# Patient Record
Sex: FEMALE | Race: BLACK OR AFRICAN AMERICAN | Employment: UNEMPLOYED | URBAN - METROPOLITAN AREA
[De-identification: names, ages, dates, MRNs, and addresses within clinical notes are randomized per-mention and may not be internally consistent; named-entity substitution may affect disease eponyms.]

---

## 2019-06-25 ENCOUNTER — HOSPITAL ENCOUNTER (EMERGENCY)
Facility: CLINIC | Age: 24
Discharge: HOME | End: 2019-06-25
Attending: EMERGENCY MEDICINE | Admitting: EMERGENCY MEDICINE
Payer: COMMERCIAL

## 2019-06-25 ENCOUNTER — APPOINTMENT (OUTPATIENT)
Dept: ULTRASOUND IMAGING | Facility: CLINIC | Age: 24
End: 2019-06-25
Attending: EMERGENCY MEDICINE
Payer: COMMERCIAL

## 2019-06-25 VITALS
DIASTOLIC BLOOD PRESSURE: 64 MMHG | BODY MASS INDEX: 29.88 KG/M2 | RESPIRATION RATE: 18 BRPM | TEMPERATURE: 98.3 F | OXYGEN SATURATION: 99 % | HEART RATE: 76 BPM | SYSTOLIC BLOOD PRESSURE: 108 MMHG | WEIGHT: 175 LBS | HEIGHT: 64 IN

## 2019-06-25 DIAGNOSIS — O03.9 SPONTANEOUS MISCARRIAGE: ICD-10-CM

## 2019-06-25 LAB
ABO + RH BLD: NORMAL
ABO + RH BLD: NORMAL
ANISOCYTOSIS BLD QL SMEAR: ABNORMAL
B-HCG SERPL-ACNC: 98 IU/L (ref 0–5)
BASOPHILS # BLD AUTO: 0 10E9/L (ref 0–0.2)
BASOPHILS NFR BLD AUTO: 0.3 %
BLD GP AB SCN SERPL QL: NORMAL
BLOOD BANK CMNT PATIENT-IMP: NORMAL
DIFFERENTIAL METHOD BLD: ABNORMAL
ELLIPTOCYTES BLD QL SMEAR: ABNORMAL
EOSINOPHIL # BLD AUTO: 0.1 10E9/L (ref 0–0.7)
EOSINOPHIL NFR BLD AUTO: 1.3 %
ERYTHROCYTE [DISTWIDTH] IN BLOOD BY AUTOMATED COUNT: 24.5 % (ref 10–15)
HCT VFR BLD AUTO: 33.6 % (ref 35–47)
HGB BLD-MCNC: 10.6 G/DL (ref 11.7–15.7)
IMM GRANULOCYTES # BLD: 0 10E9/L (ref 0–0.4)
IMM GRANULOCYTES NFR BLD: 0 %
LYMPHOCYTES # BLD AUTO: 2.5 10E9/L (ref 0.8–5.3)
LYMPHOCYTES NFR BLD AUTO: 35.6 %
MCH RBC QN AUTO: 22.8 PG (ref 26.5–33)
MCHC RBC AUTO-ENTMCNC: 31.5 G/DL (ref 31.5–36.5)
MCV RBC AUTO: 72 FL (ref 78–100)
MICROCYTES BLD QL SMEAR: PRESENT
MONOCYTES # BLD AUTO: 0.6 10E9/L (ref 0–1.3)
MONOCYTES NFR BLD AUTO: 8.4 %
NEUTROPHILS # BLD AUTO: 3.8 10E9/L (ref 1.6–8.3)
NEUTROPHILS NFR BLD AUTO: 54.4 %
NRBC # BLD AUTO: 0 10*3/UL
NRBC BLD AUTO-RTO: 0 /100
PLATELET # BLD AUTO: 263 10E9/L (ref 150–450)
PLATELET # BLD EST: ABNORMAL 10*3/UL
RBC # BLD AUTO: 4.65 10E12/L (ref 3.8–5.2)
SPECIMEN EXP DATE BLD: NORMAL
WBC # BLD AUTO: 7 10E9/L (ref 4–11)

## 2019-06-25 PROCEDURE — 86900 BLOOD TYPING SEROLOGIC ABO: CPT | Performed by: EMERGENCY MEDICINE

## 2019-06-25 PROCEDURE — 99284 EMERGENCY DEPT VISIT MOD MDM: CPT | Mod: 25

## 2019-06-25 PROCEDURE — 85025 COMPLETE CBC W/AUTO DIFF WBC: CPT | Performed by: EMERGENCY MEDICINE

## 2019-06-25 PROCEDURE — 86850 RBC ANTIBODY SCREEN: CPT | Performed by: EMERGENCY MEDICINE

## 2019-06-25 PROCEDURE — 84702 CHORIONIC GONADOTROPIN TEST: CPT | Performed by: EMERGENCY MEDICINE

## 2019-06-25 PROCEDURE — 86901 BLOOD TYPING SEROLOGIC RH(D): CPT | Performed by: EMERGENCY MEDICINE

## 2019-06-25 PROCEDURE — 25000132 ZZH RX MED GY IP 250 OP 250 PS 637: Performed by: EMERGENCY MEDICINE

## 2019-06-25 PROCEDURE — 76801 OB US < 14 WKS SINGLE FETUS: CPT

## 2019-06-25 RX ORDER — IBUPROFEN 600 MG/1
600 TABLET, FILM COATED ORAL ONCE
Status: COMPLETED | OUTPATIENT
Start: 2019-06-25 | End: 2019-06-25

## 2019-06-25 RX ADMIN — IBUPROFEN 600 MG: 600 TABLET ORAL at 23:08

## 2019-06-25 ASSESSMENT — MIFFLIN-ST. JEOR: SCORE: 1538.79

## 2019-06-25 ASSESSMENT — ENCOUNTER SYMPTOMS
DIZZINESS: 0
LIGHT-HEADEDNESS: 0

## 2019-06-25 NOTE — ED AVS SNAPSHOT
Emergency Department  [address] [place] [address]  Phone:  [phone]  Fax:  [phone]                                    Osvaldo Nunez   MRN: [id number]    Department:   Emergency Department   Date of Visit:  6/25/2019           After Visit Summary Signature Page    I have received my discharge instructions, and my questions have been answered. I have discussed any challenges I see with this plan with the nurse or doctor.    ..........................................................................................................................................  Patient/Patient Representative Signature      ..........................................................................................................................................  Patient Representative Print Name and Relationship to Patient    ..................................................               ................................................  Date                                   Time    ..........................................................................................................................................  Reviewed by Signature/Title    ...................................................              ..............................................  Date                                               Time          22EPIC Rev 08/18

## 2019-06-26 NOTE — ED TRIAGE NOTES
"Patient was 7 weeks pregnant, has had bleeding for the past two weeks, OB told advised her it would be a miscarriage and today patient stated \"baby came out today\" now having more pain and bleeding.   "

## 2019-06-26 NOTE — ED PROVIDER NOTES
"  History     Chief Complaint:  Vaginal Bleeding     HPI   Osvaldo Nunez is a 24 year old female () who presents to the emergency department today for evaluation of vaginal bleeding. The patient reports that her last period occurred on , and she had a positive pregnancy test on . However, for the last two weeks she has been experiencing vaginal bleeding. This prompted presentation to her OB clinic on , at which time her HCG levels were observed to be dropping, and she was informed that her symptoms were likely indicative of a miscarriage. She furthers that today she had \"something hard\" pass, which was followed by increased bleeding. The patient denies dizziness or light headedness.     Per chart review of visit to [facility] on 19:  Osvaldo Nunez is a 24 Y female evaluation of vaginal bleeding in pregnancy. Patient resides in [place], [place] and came to [place] to visit her mother. She has been spotting for the past 6 days and decided to come into the ED tonight. Her HCG on her first ED visit in [facility] on 06/10/19 was 20,496, on her clinic follow up visit on the 19 it was 3,545. Her blood type is A+. No abdominal pain, chest pain, shortness of breath. Of note, she has a history of kidney infection. No other concerns for today's visit.    US OB Transvaginal Ultrasound on 19:  Impending spontaneous .     Ultrasound from [facility] on 6/10/2019:  1. There is a single somewhat misshapen intrauterine gestational sac containing an abnormal appearing yolk sac and small fetal pole with a crown-rump length of 0.2 cm. No cardiac activity is identified. The findings are worrisome for nonviable pregnancy although determination of viability is limited given the early gestational age.    Allergies:  No Known Drug Allergies    Medications:    Medications reviewed. No current medications.     Past Medical History:    Medical history reviewed. No " "pertinent medical history.    Past Surgical History:    Surgical history reviewed. No pertinent surgical history.    Family History:    Family history reviewed. No pertinent family history.     Social History:  The patient was accompanied to the ED by her significant other.    Review of Systems   Genitourinary: Positive for vaginal bleeding (with \"something hard\").   Neurological: Negative for dizziness and light-headedness.   All other systems reviewed and are negative.      Physical Exam     Patient Vitals for the past 24 hrs:   BP Temp Temp src Pulse Resp SpO2 Height Weight   19 2310 108/64 -- -- 76 18 99 % -- --   19 2105 109/63 98.3  F (36.8  C) Oral 67 18 95 % 1.626 m (5' 4\") 79.4 kg (175 lb)     Physical Exam  General: Patient is alert and normal appearing.  HEENT: Head atraumatic    Eyes: pupils equal and reactive. Conjunctiva clear   Nares: patent   Oropharynx: no lesions, uvula midline, no palatal draping, normal voice, no trismus  Neck: Supple without lymphadenopathy, no meningismus  Chest: Heart regular rate and rhythm.   Lungs: Equal clear to auscultation with no wheeze or rales  Abdomen: Soft, underlies lower abdominal tenderness with no rebound or guarding, nondistended, normal bowel sounds  Back: No costovertebral angle tenderness, no midline C, T or L spine tenderness  Neuro: Grossly nonfocal, normal speech, strength equal bilaterally, CN 2-12 intact  Extremities: No deformities, equal radial and DP pulses. No clubbing, cyanosis.  No edema  Skin: Warm and dry with no rash.       Emergency Department Course     Imaging:  Radiology findings were communicated with the patient who voiced understanding of the findings.    US OB <14 Weeks W Transvaginal  1. No intrauterine pregnancy is identified. In the setting of a  positive pregnancy test, differential considerations include an  intrauterine pregnancy too early to visualize, a spontaneous   in progress, or possibly an ectopic " pregnancy. Clinical correlation  and close followup are recommended.  2. A right ovarian simple cyst measures 1.7 cm, and may represent a dominant follicle.  ALLISON SHARMA MD  Reading per radiology    Laboratory:  Laboratory findings were communicated with the patient who voiced understanding of the findings.    CBC: WBC 7.0, HGB 10.6 (L),   ABO/Rh Type and Screen: A+, Antibody Screen negative  HCG Quantitative: 98 (H)    Interventions:  2308 Ibuprofen 600 mg Oral    Emergency Department Course:    2121 Nursing notes and vitals reviewed.    2126 I performed an exam of the patient as documented above.     2205 IV was inserted and blood was drawn for laboratory testing, results above.    2211 The patient was sent for an OB ultrasound while in the emergency department, results above.     2330 Prior to discharge, I personally reviewed the laboratory and imaging results with the patient and answered all related questions. Patient discharged.     Impression & Plan      Medical Decision Making:  Osvaldo Nunez is a 24 year old female who presents to the emergency department today for evaluation of vaginal bleeding and passage of tissue.  Patient is noted to be having a spontaneous miscarriage.  She was initially seen on 6/10/2019 with an hCG of 20,000.  Today her hCG is 98.  She feels that she passed some tissue and now the bleeding has stopped.  On ultrasound there is no evidence of any intrauterine retained products of conception.  Previous ultrasound had revealed intrauterine pregnancy.  I feel this is likely a complete miscarriage at this time.  Patient's blood type is a positive imaged she does not need RhoGam.  Hemoglobin is stable.  I did recommend the patient follow-up with her provider to ensure hCG goes down to 0.  Patient is hemodynamically stable and denies any anemia symptoms.  Plan at this time is for discharge.  Follow-up instructions were provided as were return precautions.    Diagnosis:     ICD-10-CM    1. Spontaneous miscarriage O03.9      Disposition:   The patient is discharged to home.    Discharge Medications:  No discharge medications.    Scribe Disclosure:  I, Eva Colorado, am serving as a scribe at 9:22 PM on 6/25/2019 to document services personally performed by Kate Gates MD based on my observations and the provider's statements to me.     EMERGENCY DEPARTMENT       Kate Gates MD  06/26/19 0009

## 2019-08-22 ENCOUNTER — HOSPITAL ENCOUNTER (EMERGENCY)
Facility: CLINIC | Age: 24
Discharge: HOME | End: 2019-08-22
Attending: EMERGENCY MEDICINE | Admitting: EMERGENCY MEDICINE
Payer: COMMERCIAL

## 2019-08-22 ENCOUNTER — APPOINTMENT (OUTPATIENT)
Dept: CT IMAGING | Facility: CLINIC | Age: 24
End: 2019-08-22
Attending: EMERGENCY MEDICINE
Payer: COMMERCIAL

## 2019-08-22 VITALS
SYSTOLIC BLOOD PRESSURE: 105 MMHG | HEART RATE: 77 BPM | OXYGEN SATURATION: 100 % | DIASTOLIC BLOOD PRESSURE: 82 MMHG | HEIGHT: 65 IN | WEIGHT: 189 LBS | RESPIRATION RATE: 16 BRPM | TEMPERATURE: 98.2 F | BODY MASS INDEX: 31.49 KG/M2

## 2019-08-22 DIAGNOSIS — N10 ACUTE PYELONEPHRITIS: ICD-10-CM

## 2019-08-22 LAB
ALBUMIN SERPL-MCNC: 3.4 G/DL (ref 3.4–5)
ALBUMIN UR-MCNC: 30 MG/DL
ALP SERPL-CCNC: 83 U/L (ref 40–150)
ALT SERPL W P-5'-P-CCNC: 17 U/L (ref 0–50)
ANION GAP SERPL CALCULATED.3IONS-SCNC: 4 MMOL/L (ref 3–14)
APPEARANCE UR: ABNORMAL
AST SERPL W P-5'-P-CCNC: 19 U/L (ref 0–45)
BASOPHILS # BLD AUTO: 0 10E9/L (ref 0–0.2)
BASOPHILS NFR BLD AUTO: 0.2 %
BILIRUB SERPL-MCNC: 0.3 MG/DL (ref 0.2–1.3)
BILIRUB UR QL STRIP: NEGATIVE
BUN SERPL-MCNC: 7 MG/DL (ref 7–30)
CALCIUM SERPL-MCNC: 8.5 MG/DL (ref 8.5–10.1)
CHLORIDE SERPL-SCNC: 107 MMOL/L (ref 94–109)
CO2 SERPL-SCNC: 27 MMOL/L (ref 20–32)
COLOR UR AUTO: ABNORMAL
CREAT SERPL-MCNC: 0.57 MG/DL (ref 0.52–1.04)
DIFFERENTIAL METHOD BLD: ABNORMAL
EOSINOPHIL # BLD AUTO: 0.1 10E9/L (ref 0–0.7)
EOSINOPHIL NFR BLD AUTO: 0.8 %
ERYTHROCYTE [DISTWIDTH] IN BLOOD BY AUTOMATED COUNT: 15.6 % (ref 10–15)
GFR SERPL CREATININE-BSD FRML MDRD: >90 ML/MIN/{1.73_M2}
GLUCOSE SERPL-MCNC: 96 MG/DL (ref 70–99)
GLUCOSE UR STRIP-MCNC: NEGATIVE MG/DL
HCG UR QL: NEGATIVE
HCT VFR BLD AUTO: 31.5 % (ref 35–47)
HGB BLD-MCNC: 9.6 G/DL (ref 11.7–15.7)
HGB UR QL STRIP: ABNORMAL
IMM GRANULOCYTES # BLD: 0 10E9/L (ref 0–0.4)
IMM GRANULOCYTES NFR BLD: 0.2 %
KETONES UR STRIP-MCNC: NEGATIVE MG/DL
LEUKOCYTE ESTERASE UR QL STRIP: ABNORMAL
LIPASE SERPL-CCNC: 117 U/L (ref 73–393)
LYMPHOCYTES # BLD AUTO: 2.1 10E9/L (ref 0.8–5.3)
LYMPHOCYTES NFR BLD AUTO: 20.7 %
MCH RBC QN AUTO: 22.1 PG (ref 26.5–33)
MCHC RBC AUTO-ENTMCNC: 30.5 G/DL (ref 31.5–36.5)
MCV RBC AUTO: 73 FL (ref 78–100)
MONOCYTES # BLD AUTO: 0.7 10E9/L (ref 0–1.3)
MONOCYTES NFR BLD AUTO: 7.2 %
MUCOUS THREADS #/AREA URNS LPF: PRESENT /LPF
NEUTROPHILS # BLD AUTO: 7.2 10E9/L (ref 1.6–8.3)
NEUTROPHILS NFR BLD AUTO: 70.9 %
NITRATE UR QL: NEGATIVE
NRBC # BLD AUTO: 0 10*3/UL
NRBC BLD AUTO-RTO: 0 /100
PH UR STRIP: 7.5 PH (ref 5–7)
PLATELET # BLD AUTO: 243 10E9/L (ref 150–450)
POTASSIUM SERPL-SCNC: 3.7 MMOL/L (ref 3.4–5.3)
PROT SERPL-MCNC: 8 G/DL (ref 6.8–8.8)
RBC # BLD AUTO: 4.34 10E12/L (ref 3.8–5.2)
RBC #/AREA URNS AUTO: 90 /HPF (ref 0–2)
SODIUM SERPL-SCNC: 138 MMOL/L (ref 133–144)
SOURCE: ABNORMAL
SP GR UR STRIP: 1.01 (ref 1–1.03)
SQUAMOUS #/AREA URNS AUTO: 3 /HPF (ref 0–1)
UROBILINOGEN UR STRIP-MCNC: NORMAL MG/DL (ref 0–2)
WBC # BLD AUTO: 10.2 10E9/L (ref 4–11)
WBC #/AREA URNS AUTO: >182 /HPF (ref 0–5)
WBC CLUMPS #/AREA URNS HPF: PRESENT /HPF

## 2019-08-22 PROCEDURE — 74176 CT ABD & PELVIS W/O CONTRAST: CPT

## 2019-08-22 PROCEDURE — 87086 URINE CULTURE/COLONY COUNT: CPT | Performed by: EMERGENCY MEDICINE

## 2019-08-22 PROCEDURE — 96365 THER/PROPH/DIAG IV INF INIT: CPT

## 2019-08-22 PROCEDURE — 96375 TX/PRO/DX INJ NEW DRUG ADDON: CPT

## 2019-08-22 PROCEDURE — 81025 URINE PREGNANCY TEST: CPT | Performed by: EMERGENCY MEDICINE

## 2019-08-22 PROCEDURE — 80053 COMPREHEN METABOLIC PANEL: CPT | Performed by: EMERGENCY MEDICINE

## 2019-08-22 PROCEDURE — 87186 SC STD MICRODIL/AGAR DIL: CPT | Performed by: EMERGENCY MEDICINE

## 2019-08-22 PROCEDURE — 85025 COMPLETE CBC W/AUTO DIFF WBC: CPT | Performed by: EMERGENCY MEDICINE

## 2019-08-22 PROCEDURE — 83690 ASSAY OF LIPASE: CPT | Performed by: EMERGENCY MEDICINE

## 2019-08-22 PROCEDURE — 81001 URINALYSIS AUTO W/SCOPE: CPT | Performed by: EMERGENCY MEDICINE

## 2019-08-22 PROCEDURE — 99285 EMERGENCY DEPT VISIT HI MDM: CPT | Mod: 25

## 2019-08-22 PROCEDURE — 96361 HYDRATE IV INFUSION ADD-ON: CPT

## 2019-08-22 PROCEDURE — 87088 URINE BACTERIA CULTURE: CPT | Performed by: EMERGENCY MEDICINE

## 2019-08-22 PROCEDURE — 25000128 H RX IP 250 OP 636: Performed by: EMERGENCY MEDICINE

## 2019-08-22 RX ORDER — PHENAZOPYRIDINE HYDROCHLORIDE 200 MG/1
200 TABLET, FILM COATED ORAL 3 TIMES DAILY PRN
Qty: 9 TABLET | Refills: 0 | Status: SHIPPED | OUTPATIENT
Start: 2019-08-22

## 2019-08-22 RX ORDER — CEFTRIAXONE 1 G/1
1 INJECTION, POWDER, FOR SOLUTION INTRAMUSCULAR; INTRAVENOUS ONCE
Status: COMPLETED | OUTPATIENT
Start: 2019-08-22 | End: 2019-08-22

## 2019-08-22 RX ORDER — CEFDINIR 300 MG/1
300 CAPSULE ORAL 2 TIMES DAILY
Qty: 14 CAPSULE | Refills: 0 | Status: SHIPPED | OUTPATIENT
Start: 2019-08-23 | End: 2019-08-30

## 2019-08-22 RX ORDER — KETOROLAC TROMETHAMINE 15 MG/ML
15 INJECTION, SOLUTION INTRAMUSCULAR; INTRAVENOUS ONCE
Status: COMPLETED | OUTPATIENT
Start: 2019-08-22 | End: 2019-08-22

## 2019-08-22 RX ORDER — ONDANSETRON 2 MG/ML
4 INJECTION INTRAMUSCULAR; INTRAVENOUS ONCE
Status: COMPLETED | OUTPATIENT
Start: 2019-08-22 | End: 2019-08-22

## 2019-08-22 RX ADMIN — CEFTRIAXONE SODIUM 1 G: 1 INJECTION, POWDER, FOR SOLUTION INTRAMUSCULAR; INTRAVENOUS at 20:34

## 2019-08-22 RX ADMIN — SODIUM CHLORIDE 1000 ML: 9 INJECTION, SOLUTION INTRAVENOUS at 19:26

## 2019-08-22 RX ADMIN — ONDANSETRON 4 MG: 2 INJECTION INTRAMUSCULAR; INTRAVENOUS at 19:35

## 2019-08-22 RX ADMIN — KETOROLAC TROMETHAMINE 15 MG: 15 INJECTION, SOLUTION INTRAMUSCULAR; INTRAVENOUS at 19:35

## 2019-08-22 SDOH — HEALTH STABILITY: MENTAL HEALTH: HOW OFTEN DO YOU HAVE A DRINK CONTAINING ALCOHOL?: NEVER

## 2019-08-22 ASSESSMENT — ENCOUNTER SYMPTOMS
MYALGIAS: 0
DIARRHEA: 0
SHORTNESS OF BREATH: 0
FLANK PAIN: 1
COUGH: 0
DIFFICULTY URINATING: 1
VOMITING: 0
ABDOMINAL PAIN: 1
HEMATURIA: 1
NAUSEA: 1
DYSURIA: 1

## 2019-08-22 ASSESSMENT — MIFFLIN-ST. JEOR: SCORE: 1618.18

## 2019-08-22 NOTE — ED PROVIDER NOTES
"  History     Chief Complaint:  Flank Pain      HPI   Osvaldo Nunez is a 24 year old female who presents with flank pain. The patient says that this morning she had left sided flank pain, but now the pain is on both sides. She says that the pain is more of a soreness than a sharpness. She says that the pain is worse when she is standing or sitting. The patient also says that on Tuesday 8/20 she started having urinary symptoms consisting of dysuria, light hematuria, and difficulty urinating. She says that she was treated for a bladder infection on 4/25 and was found to also have a kidney stone. She also notes that she has a miscarriage in late June, her first pregnancy, during the first trimester. She denies any chance of pregnancy stating that she had her period in early August. The patient also notes some nausea and fever, though she says that the fever is something that happens every night and is not new. She also complains of some pain on the \"inside of [her] stomach\". She endorses the use of tylenol today to some relief. She denies any cough, chest pain, shortness of breath, vomiting, diarrhea, or any leg pain and swelling.       Allergies:  No Known Drug Allergies     Medications:    Tylenol  Senokot    Past Medical History:    GERD  Cystitis  Constipation  Stain of left knee  Dysmenorrhea  Congestion   Miscarriage     Past Surgical History:    Surgical history reviewed. No pertinent surgical history.    Family History:    Family history reviewed. No pertinent family history.     Social History:  The patient was accompanied to the ED by family.  Smoking Status: Never Smoker  Smokeless Tobacco: Never Used  Alcohol Use: Negative  Drug Use: Negative  PCP: Clinic, [facility] [place]   Marital Status:       Review of Systems   Respiratory: Negative for cough and shortness of breath.    Cardiovascular: Negative for chest pain.   Gastrointestinal: Positive for abdominal pain and nausea. Negative for " "diarrhea and vomiting.   Genitourinary: Positive for difficulty urinating, dysuria, flank pain and hematuria.   Musculoskeletal: Negative for myalgias.   All other systems reviewed and are negative.    Physical Exam     Patient Vitals for the past 24 hrs:   BP Temp Temp src Pulse Heart Rate Resp SpO2 Height Weight   08/22/19 2106 105/82 -- -- 77 -- -- 100 % -- --   08/22/19 1852 109/65 98.2  F (36.8  C) Oral -- 82 16 100 % 1.651 m (5' 5\") 85.7 kg (189 lb)        Physical Exam  Nursing note and vitals reviewed.  Constitutional:  Appears well-developed and well-nourished.   HENT:   Head:    Atraumatic.   Mouth/Throat:   Oropharynx is clear and moist. No oropharyngeal exudate.   Eyes:    Pupils are equal, round, and reactive to light.   Neck:    Normal range of motion. Neck supple.      No tracheal deviation present. No thyromegaly present.   Cardiovascular:  Normal rate, regular rhythm, no murmur   Pulmonary/Chest: Breath sounds are clear and equal without wheezes or crackles.  Abdominal:   Soft. Bowel sounds are normal. Exhibits no distension and      no mass. There is mild suprapubic tenderness without rebound or guarding       There is no rebound and no guarding.   Back:   Mild bilateral CVA tenderness. No discoloration rash or swelling.   Musculoskeletal:  Exhibits no edema.   Lymphadenopathy:  No cervical adenopathy.   Neurological:   Alert and oriented to person, place, and time.   Skin:    Skin is warm and dry. No rash noted. No pallor.    Emergency Department Course     Imaging:  Radiology findings were communicated with the patient who voiced understanding of the findings.    CT Abdomen Pelvis w/o Contrast  No acute abnormalities seen in the abdomen or pelvis.   LYNDA AC MD  Reading per radiology    Laboratory:  Laboratory findings were communicated with the patient who voiced understanding of the findings.    CBC: WBC 10.2, HGB 9.6 (L),   CMP: WNL (Creatinine 0.57)  Lipase: 117  HCG qualitative " urine: negative  UA: blood moderate (A), pH 7.5 (H), protein albumin 30 (A), leukocyte esterase large (A), WBC >182 (H), RBC 90 (H), WBC clumps present (A), squamous epithelial 3 (H), mucous present (A) o/w WNL    Interventions:  1926 NS 1000 mL IV  1935 Zofran 4 mg IV  1935 Toradol 15 mg IV  2034  Rocephin 1 g IV    Emergency Department Course:    1851 Nursing notes and vitals reviewed.    1904 I performed an exam of the patient as documented above.     1921 The patient provided a urine sample here in the emergency department. This was sent for laboratory testing, findings above.     1922 IV was inserted and blood was drawn for laboratory testing, results above.     1952 The patient was sent for a CT while in the emergency department, results above.      2045 The patient is discharged to home.     Impression & Plan      Medical Decision Making:  Osvaldo Nunez is a 24 year old female who presents with symptoms consistent with pyelonephritis.  Urinalysis confirms the infection.  she is not pregnant. UA is consistent with infection.  There has been no fever, vomiting or significant abdominal pain. No convincing evidence of coexisting ureterolithiasis. There is no clinical evidence of appendicitis or diverticulitis. CT was performed and there was no sign of kidney stone obstruction or other urinary tract abnormality.  I will start her on antibiotics for the infection.  she received the first IV dose of antibiotics in the department.  I recommended she return immediately for increasing pain, vomiting, fever, inability to tolerate her oral medications, or for any other concerns.  I recommended she follow up with her primary physician in 2-3 days if symptoms have not completely resolved, and that she finish her entire course of antibiotics even if symptom free.        Diagnosis:    ICD-10-CM    1. Acute pyelonephritis N10      Disposition:   Findings and plan explained to the Patient. Patient discharged home with  instructions regarding supportive care, medications, and reasons to return. The importance of close follow-up was reviewed.      Discharge Medications:     Review of your medicines      START taking      Dose / Directions   cefdinir 300 MG capsule  Commonly known as:  OMNICEF      Dose:  300 mg  Start taking on:  8/23/2019  Take 1 capsule (300 mg) by mouth 2 times daily for 7 days  Quantity:  14 capsule  Refills:  0     phenazopyridine 200 MG tablet  Commonly known as:  PYRIDIUM      Dose:  200 mg  Take 1 tablet (200 mg) by mouth 3 times daily as needed (painful urination)  Quantity:  9 tablet  Refills:  0           Where to get your medicines      Some of these will need a paper prescription and others can be bought over the counter. Ask your nurse if you have questions.    Bring a paper prescription for each of these medications    cefdinir 300 MG capsule    phenazopyridine 200 MG tablet         Scribe Disclosure:  I, Jona Kraus, am serving as a scribe at 6:52 PM on 8/22/2019 to document services personally performed by Karuna Pryor MD based on my observations and the provider's statements to me.       EMERGENCY DEPARTMENT       Karuna Pryor MD  08/23/19 0051

## 2019-08-22 NOTE — ED TRIAGE NOTES
C/o dysuria, hematuria, and frequency c difficulty urinating X 2 days. Today developed left flank pain that has now also radiated to the right flank.

## 2019-08-22 NOTE — ED AVS SNAPSHOT
Emergency Department  [address] [place] [address]  Phone:  [phone]  Fax:  [phone]                                    Osvaldo Nunez   MRN: [id number]    Department:   Emergency Department   Date of Visit:  8/22/2019           After Visit Summary Signature Page    I have received my discharge instructions, and my questions have been answered. I have discussed any challenges I see with this plan with the nurse or doctor.    ..........................................................................................................................................  Patient/Patient Representative Signature      ..........................................................................................................................................  Patient Representative Print Name and Relationship to Patient    ..................................................               ................................................  Date                                   Time    ..........................................................................................................................................  Reviewed by Signature/Title    ...................................................              ..............................................  Date                                               Time          22EPIC Rev 08/18

## 2019-08-23 NOTE — RESULT ENCOUNTER NOTE
Emergency Dept/Urgent Care discharge antibiotic: Cefdinir (Omnicef) 300 mg capsule, 1 capsule (300 mg) by mouth 2 times daily for 7 days  Date of Rx (If Applicable): 8/23/19  Recommendations per [facility] ED Lab result protocol - Urine culture protocol.

## 2019-08-24 LAB
BACTERIA SPEC CULT: ABNORMAL
Lab: ABNORMAL
SPECIMEN SOURCE: ABNORMAL